# Patient Record
(demographics unavailable — no encounter records)

---

## 2024-10-10 NOTE — HISTORY OF PRESENT ILLNESS
[4] : 4 [2] : 2 [Localized] : localized [Intermittent] : intermittent [Household chores] : household chores [Physical therapy] : physical therapy [Sitting] : sitting [] : yes [de-identified] : 08/31/2023 here for a follow up s/p right tka cont ,pt, doing well   6.6.24  denies any significant medical HX,  PATIENT HERE FOR JULIANA KNEE FOLLOW UP. RT TKA DOS: 7.19.23 REPORTS DOING WELL WITHOUT ANY COMLAINTS.  Lt knee pain years, progressively worsening over time. Denies any injury to knee. Pain indicated to lateral aspect of knee, 5/10, intermittent, achy and sharp at times. Worsening with walking, stairs to point affecting quality of life and adl's. Some relief wiht rest. failed conservative measures of nsaids/pt and injections.   9.5.24 PATIENT HERE FOR LEFT KNEE PAIN, DOS: 8.7.24  10.10.24 PATIENT HERE FOR LEFT KNEE PAIN, DOS: 8.7.24 [FreeTextEntry1] : RIGHT KNEE [FreeTextEntry6] : swelling,stiffness  [de-identified] : pt

## 2024-10-10 NOTE — ASSESSMENT
[FreeTextEntry1] : S/P LT TKA 8/7/24: NO F/C/S. DOING WELL. XRAYS REVIEWED WITH COMPONENTS WELL FIXED. NO SIGNS OF INFECTION. GOOD LIGAMENT BALANCE ON EXAM.  WE AGAIN DISCUSSED ABX PPX FOR DENTAL PROCEDURES FOR AT LEAST 2 YEARS FROM SURGERY. QUESTIONS ANSWERED.

## 2024-10-10 NOTE — PHYSICAL EXAM
[Left] : left knee [] : patient ambulates without assistive device [TWNoteComboBox7] : flexion 115 degrees [de-identified] : extension 0 degrees

## 2025-01-16 NOTE — IMAGING
[Left] : left knee [AP] : anteroposterior [Lateral] : lateral [[place]] : skyline [Components well fixed, in good position] : Components well fixed, in good position

## 2025-01-16 NOTE — PHYSICAL EXAM
[Left] : left knee [] : no sign of infection [TWNoteComboBox7] : flexion 110 degrees [de-identified] : extension 0 degrees

## 2025-01-16 NOTE — DISCUSSION/SUMMARY
[de-identified] : I, Georgia Butcher, am scribing for Dr. West in his presence for the chief complaint, physical exam, studies, assessment, and/or plan.

## 2025-01-16 NOTE — HISTORY OF PRESENT ILLNESS
[4] : 4 [2] : 2 [Localized] : localized [Intermittent] : intermittent [Household chores] : household chores [Physical therapy] : physical therapy [Sitting] : sitting [] : yes [de-identified] : 08/31/2023 here for a follow up s/p right tka cont ,pt, doing well   6.6.24  denies any significant medical HX,  PATIENT HERE FOR JULIANA KNEE FOLLOW UP. RT TKA DOS: 7.19.23 REPORTS DOING WELL WITHOUT ANY COMLAINTS.  Lt knee pain years, progressively worsening over time. Denies any injury to knee. Pain indicated to lateral aspect of knee, 5/10, intermittent, achy and sharp at times. Worsening with walking, stairs to point affecting quality of life and adl's. Some relief wiht rest. failed conservative measures of nsaids/pt and injections.   9.5.24 PATIENT HERE FOR LEFT KNEE PAIN, DOS: 8.7.24  10.10.24 PATIENT HERE FOR LEFT KNEE PAIN, DOS: 8.7.24  1.16.25 PATIENT HERE FOR LEFT KNEE PAIN, DOS: 8.7.24 [FreeTextEntry1] : RIGHT KNEE [FreeTextEntry6] : swelling,stiffness  [de-identified] : pt